# Patient Record
Sex: MALE | Race: WHITE | NOT HISPANIC OR LATINO | Employment: FULL TIME | URBAN - METROPOLITAN AREA
[De-identification: names, ages, dates, MRNs, and addresses within clinical notes are randomized per-mention and may not be internally consistent; named-entity substitution may affect disease eponyms.]

---

## 2023-02-07 ENCOUNTER — HOSPITAL ENCOUNTER (EMERGENCY)
Facility: HOSPITAL | Age: 28
Discharge: HOME | End: 2023-02-08
Attending: EMERGENCY MEDICINE

## 2023-02-07 VITALS
HEART RATE: 85 BPM | TEMPERATURE: 98.2 F | RESPIRATION RATE: 17 BRPM | DIASTOLIC BLOOD PRESSURE: 86 MMHG | OXYGEN SATURATION: 99 % | SYSTOLIC BLOOD PRESSURE: 138 MMHG

## 2023-02-07 DIAGNOSIS — S61.209A AVULSION OF FINGER TIP, INITIAL ENCOUNTER: Primary | ICD-10-CM

## 2023-02-07 RX ORDER — TRANEXAMIC ACID 100 MG/ML
500 INJECTION, SOLUTION INTRAVENOUS ONCE
Status: COMPLETED | OUTPATIENT
Start: 2023-02-07 | End: 2023-02-07

## 2023-02-07 RX ADMIN — TRANEXAMIC ACID 500 MG: 100 INJECTION, SOLUTION INTRAVENOUS at 23:37

## 2023-02-08 NOTE — ED ATTENDING ATTESTATION
2/7/2023  Debbie CHAVES, DO, saw and evaluated the patient  I have discussed the patient with the resident/non-physician practitioner and agree with the resident's/non-physician practitioner's findings, Plan of Care, and MDM as documented in the resident's/non-physician practitioner's note, except where noted  All available labs and Radiology studies were reviewed  I was present for key portions of any procedure(s) performed by the resident/non-physician practitioner and I was immediately available to provide assistance  At this point I agree with the current assessment done in the Emergency Department  I have conducted an independent evaluation of this patient a history and physical is as follows:    53-year-old male presents after slicing a piece of right middle fingertip with mandolin  Patient denies other complaints  Unsure when his last tetanus shot was  On exam-no acute distress, no respiratory distress, small avulsion of right middle finger with small amount of bleeding    Plan- we will try TXA or Surgifoam to stop bleeding with local wound care, update tetanus    ED Course         Critical Care Time  Procedures

## 2023-02-08 NOTE — DISCHARGE INSTRUCTIONS
You have been seen for an avulsion of the finger pad  You should return to the ED if you develop persistent bleeding or other worsening symptoms  Follow up with your primary care doctor

## 2023-02-08 NOTE — ED PROVIDER NOTES
History  Chief Complaint   Patient presents with   • Finger Injury     Pt reports slicing off a piece of right middle finger with a mandolin about an hour and a half ago  Bleeding controlled at this time      82-year-old male with no known medical history presents with a finger injury  Patient reports slicing off the tip of the finger pad of right middle finger with a mandolin  This occurred about an hour and a half prior to arrival   Patient states that he has been putting pressure on the finger since then  However, the finger appears to still be bleeding  No numbness or tingling  No weakness  Patient reports that he does not think that he is up-to-date on tetanus  None       History reviewed  No pertinent past medical history  History reviewed  No pertinent surgical history  History reviewed  No pertinent family history  I have reviewed and agree with the history as documented  E-Cigarette/Vaping     E-Cigarette/Vaping Substances           Review of Systems   Constitutional: Negative for appetite change, chills, fatigue and fever  HENT: Negative  Eyes: Negative  Respiratory: Negative for cough, chest tightness and shortness of breath  Cardiovascular: Negative for chest pain and palpitations  Gastrointestinal: Negative for abdominal pain, diarrhea, nausea and vomiting  Endocrine: Negative  Genitourinary: Negative for difficulty urinating and hematuria  Musculoskeletal: Negative for arthralgias and myalgias  Skin: Positive for wound  Negative for pallor and rash  Allergic/Immunologic: Negative  Neurological: Negative for dizziness, weakness, light-headedness and headaches  Hematological: Negative          Physical Exam  ED Triage Vitals   Temperature Pulse Respirations Blood Pressure SpO2   02/07/23 2235 02/07/23 2235 02/07/23 2235 02/07/23 2237 02/07/23 2235   98 2 °F (36 8 °C) 85 17 138/86 99 %      Temp Source Heart Rate Source Patient Position - Orthostatic VS BP Location FiO2 (%)   02/07/23 2235 -- -- -- --   Oral          Pain Score       02/07/23 2235       No Pain             Orthostatic Vital Signs  Vitals:    02/07/23 2235 02/07/23 2237   BP:  138/86   Pulse: 85        Physical Exam  Vitals and nursing note reviewed  Constitutional:       General: He is not in acute distress  Appearance: Normal appearance  He is not ill-appearing  HENT:      Head: Normocephalic and atraumatic  Nose: Nose normal    Eyes:      Conjunctiva/sclera: Conjunctivae normal    Cardiovascular:      Rate and Rhythm: Normal rate and regular rhythm  Pulmonary:      Effort: Pulmonary effort is normal  No respiratory distress  Abdominal:      General: Abdomen is flat  Palpations: Abdomen is soft  Musculoskeletal:         General: Normal range of motion  Cervical back: Normal range of motion and neck supple  Skin:     General: Skin is warm and dry  Comments: Patient has avulsion of the tip of the finger pad of the right middle finger  Wound is still oozing  Normal sensation and capillary refill  Neurological:      General: No focal deficit present  Mental Status: He is alert and oriented to person, place, and time  ED Medications  Medications   tetanus-diphtheria-acellular pertussis (BOOSTRIX) IM injection 0 5 mL (0 5 mL Intramuscular Not Given 2/7/23 2337)   tranexamic acid 100mg/mL (for epistaxis) 500 mg (500 mg Nasal Given by Other 2/7/23 2337)       Diagnostic Studies  Results Reviewed     None                 No orders to display         Procedures  Procedures      ED Course                             SBIRT 20yo+    Flowsheet Row Most Recent Value   SBIRT (25 yo +)    In order to provide better care to our patients, we are screening all of our patients for alcohol and drug use  Would it be okay to ask you these screening questions?  No Filed at: 02/07/2023 3496                Medical Decision Making  14-year-old male who presents with an avulsion of the tip of the finger pad  There is still active bleeding  We will order a tetanus shot since patient's last shot was in 2009  Will order another tetanus shot  We will wrap the finger in TXA soaked gauze  No need for labs or imaging as this was a minor injury and it would not aid in the diagnosis  Injury was superficial and there is no need to look for deeper traumatic injury  Patient refused tetanus shot  He understands the risks  The area was cleaned  TXA applied to a gauze pad and wrapped around patient's finger  Pressure was applied  Bleeding subsided  The finger was wrapped with the TXA soaked gauze and Kerlix  Discussed all results and plans with patient  Recommend follow up with primary care physician  Return precautions given  All questions answered  Avulsion of finger tip, initial encounter: acute illness or injury  Amount and/or Complexity of Data Reviewed  External Data Reviewed:      Details: Reviewed past medical history            Disposition  Final diagnoses:   Avulsion of finger tip, initial encounter     Time reflects when diagnosis was documented in both MDM as applicable and the Disposition within this note     Time User Action Codes Description Comment    2/7/2023 11:53 PM Hakim, [place] Ratel Add [S61 209A] Avulsion of finger, initial encounter     2/7/2023 11:54 PM Hakim, [place] Ratel Add [S61 209A] Avulsion of finger tip, initial encounter     2/7/2023 11:54 PM Hakim, [place] Ratel Modify [S61 209A] Avulsion of finger tip, initial encounter     2/7/2023 11:54 PM Hakim, [place] Ratel Remove [S61 209A] Avulsion of finger, initial encounter       ED Disposition     ED Disposition   Discharge    Condition   Good    Date/Time   Tue Feb 7, 2023 11:53 PM    Comment   Иван Whitt discharge to home/self care                 Follow-up Information     Follow up With Specialties Details Why Contact Info Additional [address] [facility] [address] [facility], [address] [place] [id number]  [address], [address], [address] [place], [place], [address]          There are no discharge medications for this patient  No discharge procedures on file  PDMP Review     None           ED Provider  Attending physically available and evaluated Kingsley Cui I managed the patient along with the ED Attending      Electronically Signed by         Dilip Matthews DO  02/08/23 6626

## 2025-08-06 ENCOUNTER — HOSPITAL ENCOUNTER (EMERGENCY)
Facility: HOSPITAL | Age: 30
Discharge: HOME | End: 2025-08-06
Attending: EMERGENCY MEDICINE | Admitting: EMERGENCY MEDICINE

## 2025-08-06 VITALS
RESPIRATION RATE: 18 BRPM | SYSTOLIC BLOOD PRESSURE: 135 MMHG | HEART RATE: 73 BPM | WEIGHT: 139.55 LBS | DIASTOLIC BLOOD PRESSURE: 71 MMHG | TEMPERATURE: 98.3 F | OXYGEN SATURATION: 100 %

## 2025-08-06 DIAGNOSIS — Z20.3 RABIES EXPOSURE: Primary | ICD-10-CM

## 2025-08-06 PROCEDURE — 90375 RABIES IG IM/SC: CPT

## 2025-08-06 PROCEDURE — 90471 IMMUNIZATION ADMIN: CPT

## 2025-08-06 PROCEDURE — 90472 IMMUNIZATION ADMIN EACH ADD: CPT

## 2025-08-06 PROCEDURE — 90675 RABIES VACCINE IM: CPT

## 2025-08-06 PROCEDURE — 99283 EMERGENCY DEPT VISIT LOW MDM: CPT

## 2025-08-06 PROCEDURE — 96372 THER/PROPH/DIAG INJ SC/IM: CPT

## 2025-08-06 RX ADMIN — RABIES IMMUNE GLOBULIN (HUMAN) 1200 UNITS: 300 INJECTION, SOLUTION INFILTRATION; INTRAMUSCULAR at 12:43

## 2025-08-06 RX ADMIN — Medication 1 ML: at 12:10
